# Patient Record
Sex: FEMALE | Race: WHITE
[De-identification: names, ages, dates, MRNs, and addresses within clinical notes are randomized per-mention and may not be internally consistent; named-entity substitution may affect disease eponyms.]

---

## 2022-10-07 ENCOUNTER — HOSPITAL ENCOUNTER (EMERGENCY)
Dept: HOSPITAL 47 - EC | Age: 47
Discharge: HOME | End: 2022-10-07
Payer: COMMERCIAL

## 2022-10-07 VITALS — SYSTOLIC BLOOD PRESSURE: 140 MMHG | DIASTOLIC BLOOD PRESSURE: 80 MMHG

## 2022-10-07 VITALS — HEART RATE: 68 BPM

## 2022-10-07 VITALS — TEMPERATURE: 98.4 F | RESPIRATION RATE: 16 BRPM

## 2022-10-07 DIAGNOSIS — I10: ICD-10-CM

## 2022-10-07 DIAGNOSIS — S82.832A: Primary | ICD-10-CM

## 2022-10-07 DIAGNOSIS — Z88.0: ICD-10-CM
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
## 2022-10-07 DIAGNOSIS — F17.200: ICD-10-CM

## 2022-10-07 PROCEDURE — 27752 TREATMENT OF TIBIA FRACTURE: CPT

## 2022-10-07 PROCEDURE — 96372 THER/PROPH/DIAG INJ SC/IM: CPT

## 2022-10-07 PROCEDURE — 73620 X-RAY EXAM OF FOOT: CPT

## 2022-10-07 PROCEDURE — 73600 X-RAY EXAM OF ANKLE: CPT

## 2022-10-07 PROCEDURE — 73610 X-RAY EXAM OF ANKLE: CPT

## 2022-10-07 PROCEDURE — 99283 EMERGENCY DEPT VISIT LOW MDM: CPT

## 2022-10-07 NOTE — XR
EXAMINATION TYPE: XR foot limited LT

 

DATE OF EXAM: 10/7/2022

 

COMPARISON: None

 

HISTORY: Fall, pain

 

TECHNIQUE: 2 view left foot

 

FINDINGS: The anterior dislocation of the tibia in relation to the talus is evident on the lateral pr
ojection. Posterior tibial fracture is evident. Distal fibular fracture is not well-visualized on thi
s exam. Soft tissue swelling is at the ankle. There may be a medial malleolar fracture on this image 
was not identified on ankle images.

 

Plantar calcaneal heel spur is present. The left foot otherwise appears intact and joint spaces other
wise appear normal.

 

IMPRESSION:

1.  Fracture dislocation of the distal left ankle. Please also see ankle dictation same date.

2. Plantar calcaneal heel spur.

3. Left foot appears intact as visualized. Follow-up can be performed in 7-10 days of acute trauma fo
r continued pain.

## 2022-10-07 NOTE — ED
Lower Extremity Injury HPI





- General


Chief Complaint: Extremity Injury, Lower


Stated Complaint: LT ankle injury


Time Seen by Provider: 10/07/22 06:39


Source: patient, family, RN notes reviewed


Mode of arrival: wheelchair


Limitations: no limitations





- History of Present Illness


Initial Comments: 


Patient is a 47-year-old  female presents to the emergency room after 

tripping up the stairs of her RV earlier this morning and rolling her left 

ankle. Since that time she has had significant pain and swelling in the joint. 

She did take ibuprofen approximately one hour ago without any changes in 

symptoms. Her range of motion is limited to to pain and swelling. She denies any

other extremity injury or head injury. She  reports the fall was a trip and fall

without any syncope, dizziness or altered mental status. She denies any other 

complaints or concerns including any chest pain, shortness of breath, abdominal 

pain, nausea, vomiting, fevers or chills. She denies any lower extremity 

swelling prior to the injury.  She has a past medical history significant for 

hypertension.





- Related Data


                                    Allergies











Allergy/AdvReac Type Severity Reaction Status Date / Time


 


Penicillins Allergy  Unknown Verified 10/07/22 06:31





   Childhood  














Review of Systems


ROS Statement: 


Those systems with pertinent positive or pertinent negative responses have been 

documented in the HPI.





ROS Other: All systems not noted in ROS Statement are negative.





Past Medical History


Past Medical History: Hypertension


History of Any Multi-Drug Resistant Organisms: C-DIFF


Date of last positivie culture/infection: 


Past Surgical History:  Section, Hernia Repair


Past Psychological History: Depression


Smoking Status: Current every day smoker


Past Alcohol Use History: Heavy


Past Drug Use History: None Reported





General Exam


Limitations: no limitations


General appearance: alert, in no apparent distress


Head exam: Present: atraumatic, normocephalic, normal inspection


Eye exam: Present: normal appearance, PERRL, EOMI.  Absent: scleral icterus, 

conjunctival injection, periorbital swelling


ENT exam: Present: normal exam, mucous membranes moist


Neck exam: Present: normal inspection, full ROM


Respiratory exam: Absent: respiratory distress, accessory muscle use


Rectal exam: Present: deferred


  ** Left


Ankle exam: Present: tenderness, swelling, erythema (mild).  Absent: full ROM


Foot/Toe exam: Present: tenderness, swelling, erythema (mild dorsal aspect)


Neurovascular tendon exam: Present: no vascular compromise


Gait: observed and limited by pain


Back exam: Present: normal inspection


Neurological exam: Present: alert, oriented X3, CN II-XII intact


Psychiatric exam: Present: normal affect, normal mood


Skin exam: Present: erythema (as above).  Absent: abrasion





Course


                                   Vital Signs











  10/07/22 10/07/22





  06:32 09:51


 


Temperature 98.4 F 


 


Pulse Rate 91 68


 


Respiratory 16 16





Rate  


 


Blood Pressure 124/81 120/80


 


O2 Sat by Pulse 96 99





Oximetry  














Procedures





- Orthopedic Fracture Reduction


  ** Fracture #1


Consent Obtained: verbal consent


Side: left


Fracture Reduction Location: tibia, fibula


Analgesia: other


Technique: direct manipulation (morphine, valium, tordol)


Post Reduction X-rays Demonstrate: anatomical reduction


Post-Reduction Neuro Exam: intact, no change


Post-Reduction Vascular Exam: intact, no change


Splint Applied: Yes


Patient Tolerated Procedure: well, no complications





- Orthopedic Joint Reduction


  ** Joint #1


Side: left





- Orthopedic Splinting/Casting


  ** Injury #1


Side: left


Lower Extremity Injury Location: ankle


Lower Extremity Immobilizer: posterior splint, Ace wrap


Other Orthopedic Equipment: crutches





Medical Decision Making





- Medical Decision Making


47-year-old  female presenting to the emergency room after a trip and 

fall earlier this morning with pain and swelling to her left ankle after 

bleeding to have rolled her ankle.  No other extremity injury or head trauma. 

Will check x-ray of the left ankle and left foot. Will give morphine for pain 

with recent ibuprofen administration at home. Will monitor response.





Continued pain after morphine will give Toradol along with Valium for reduction 

and splinting of fractured tibia-fibula with anterior dislocation of the tibia 

from the talus dislocation.





Tolerated reduction and splinting well. Will give Tylenol 3 starter pack to 

utilize for pain along with ibuprofen. Advised need for nonweightbearing status 

and maintaining of splint with elevation when possible. Advised need for 

orthopedic follow-up. Will discharge home.





Case discussed with Dr. Gaines.





- Radiology Data


Radiology results: report reviewed, image reviewed


 X-ray left foot shows plantar Callus spur. Left foot appears visually intact 

fracture to the left ankle distal redemonstrated.


X-ray left ankle complete impression fracture of the distal fibula and posterior

tibia dislocation from the talus from the tibia anteriorly from the talus.


Repeat x-ray of left ankle post reduction shows near anatomical alignment and 

positioning of the distal fibula and tibia fractures and reduction of posterior 

dislocation taltotibia junction





Disposition


Clinical Impression: 


 Fracture, tibia and fibula





Disposition: HOME SELF-CARE


Instructions (If sedation given, give patient instructions):  Moderate Sedation 

(ED), Ankle Fracture (ED)


Additional Instructions: 


Please continue to maintain continuous use of splint. No weightbearing with 

crutches use. May return to work if able to not bear weight and utilize crutc

hes.  Please follow-up with orthopedist.  Utilize Tylenol 3 starter pack and 

over-the-counter Motrin as needed for pain.  May apply ice every 2-3 hours for 

no longer than great 20 minutes at a time.  Elevate joint when possible.Please 

return to the Emergency Department if symptoms worsen or any other concerns.


Is patient prescribed a controlled substance at d/c from ED?: No


Referrals: 


Nonstaff,Physician [Primary Care Provider] - 1-2 days


Mike Main PAC [PHYSICIAN ASSISTANT] - 1-2 days


Time of Disposition: 10:14

## 2022-10-07 NOTE — XR
EXAMINATION TYPE: XR ankle limited LT

 

DATE OF EXAM: 10/7/2022

 

COMPARISON: 10/7/2022 earlier exam

 

HISTORY: Fracture dislocation distal left ankle

 

TECHNIQUE: AP view left ankle

 

FINDINGS: Fractures of the distal fibula is again evident. The patient's posterior tibial fracture is
 not identified. In the frontal projection there appears to be slight prominence of the medial ankle 
mortise. Also patient is along the medial aspect of the metaphyseal tibia. Donor site is not identifi
ed however. Both could be considered.

 

IMPRESSION:

1.  Reduction of prior dislocation. Some mild prominence remains at the talotibial medial ankle morti
se.

## 2022-10-07 NOTE — XR
EXAMINATION TYPE: XR ankle limited LT

 

DATE OF EXAM: 10/7/2022

 

COMPARISON: 10/7/2022 earlier exams.

 

HISTORY: Fracture dislocation left ankle

 

TECHNIQUE: Lateral left ankle

 

FINDINGS: Images obtained through a fiberglass splint. Oblique fracture of the distal fibula and post
erior tibia are again evident. There is reduction of previous tibial dislocation. Plantar calcaneal h
eel spur is present.

 

IMPRESSION:

1.  Near Anatomic alignment and positioning of the distal fibular and tibial fractures and reduction 
of prior dislocation talotibial junction.

## 2022-10-07 NOTE — XR
EXAMINATION TYPE: XR ankle complete LT

 

DATE OF EXAM: 10/7/2022

 

COMPARISON: None

 

HISTORY: Fall, pain

 

TECHNIQUE: 3V left ankle

 

FINDINGS: There is a spiral fracture of the distal metadiaphyseal fibula. There is anterior dislocati
on of the tibia from the talus There appears to be some lateral subluxation of the talus in relation 
to the tibia. Posterior tibial fracture is evident. Plantar calcaneal heel spur is noted.

 

IMPRESSION:

1.  Fractures of the distal fibula and posterior tibia.

2. Dislocation of the talus from the tibia discussed above

## 2023-02-16 ENCOUNTER — APPOINTMENT (RX ONLY)
Dept: URBAN - METROPOLITAN AREA CLINIC 259 | Facility: CLINIC | Age: 48
Setting detail: DERMATOLOGY
End: 2023-02-16

## 2023-02-16 DIAGNOSIS — D485 NEOPLASM OF UNCERTAIN BEHAVIOR OF SKIN: ICD-10-CM

## 2023-02-16 DIAGNOSIS — I78.8 OTHER DISEASES OF CAPILLARIES: ICD-10-CM | Status: STABLE

## 2023-02-16 DIAGNOSIS — D18.0 HEMANGIOMA: ICD-10-CM | Status: STABLE

## 2023-02-16 PROBLEM — D18.01 HEMANGIOMA OF SKIN AND SUBCUTANEOUS TISSUE: Status: ACTIVE | Noted: 2023-02-16

## 2023-02-16 PROBLEM — D48.5 NEOPLASM OF UNCERTAIN BEHAVIOR OF SKIN: Status: ACTIVE | Noted: 2023-02-16

## 2023-02-16 PROCEDURE — 99202 OFFICE O/P NEW SF 15 MIN: CPT | Mod: 25

## 2023-02-16 PROCEDURE — 11102 TANGNTL BX SKIN SINGLE LES: CPT

## 2023-02-16 PROCEDURE — ? BIOPSY BY SHAVE METHOD

## 2023-02-16 PROCEDURE — ? ADDITIONAL NOTES

## 2023-02-16 PROCEDURE — ? COUNSELING

## 2023-02-16 ASSESSMENT — LOCATION DETAILED DESCRIPTION DERM
LOCATION DETAILED: RIGHT MEDIAL MALAR CHEEK
LOCATION DETAILED: LEFT LATERAL ABDOMEN
LOCATION DETAILED: LEFT INFERIOR CENTRAL MALAR CHEEK

## 2023-02-16 ASSESSMENT — LOCATION ZONE DERM
LOCATION ZONE: FACE
LOCATION ZONE: TRUNK

## 2023-02-16 ASSESSMENT — LOCATION SIMPLE DESCRIPTION DERM
LOCATION SIMPLE: ABDOMEN
LOCATION SIMPLE: LEFT CHEEK
LOCATION SIMPLE: RIGHT CHEEK

## 2023-03-07 ENCOUNTER — APPOINTMENT (RX ONLY)
Dept: URBAN - METROPOLITAN AREA CLINIC 203 | Facility: CLINIC | Age: 48
Setting detail: DERMATOLOGY
End: 2023-03-07

## 2023-03-07 DIAGNOSIS — Z41.9 ENCOUNTER FOR PROCEDURE FOR PURPOSES OTHER THAN REMEDYING HEALTH STATE, UNSPECIFIED: ICD-10-CM

## 2023-03-07 PROCEDURE — ? DIOLITE 532 LASER

## 2023-03-07 ASSESSMENT — LOCATION DETAILED DESCRIPTION DERM
LOCATION DETAILED: LEFT CENTRAL MALAR CHEEK
LOCATION DETAILED: RIGHT LOWER CUTANEOUS LIP
LOCATION DETAILED: RIGHT SUPERIOR NASAL CHEEK
LOCATION DETAILED: RIGHT NASOLABIAL FOLD
LOCATION DETAILED: RIGHT INFERIOR CENTRAL MALAR CHEEK
LOCATION DETAILED: RIGHT CENTRAL MALAR CHEEK
LOCATION DETAILED: LEFT UPPER CUTANEOUS LIP
LOCATION DETAILED: RIGHT UPPER CUTANEOUS LIP

## 2023-03-07 ASSESSMENT — LOCATION SIMPLE DESCRIPTION DERM
LOCATION SIMPLE: RIGHT CHEEK
LOCATION SIMPLE: LEFT LIP
LOCATION SIMPLE: LEFT CHEEK
LOCATION SIMPLE: RIGHT LIP

## 2023-03-07 ASSESSMENT — LOCATION ZONE DERM
LOCATION ZONE: FACE
LOCATION ZONE: LIP

## 2023-03-27 ENCOUNTER — APPOINTMENT (RX ONLY)
Dept: URBAN - METROPOLITAN AREA CLINIC 259 | Facility: CLINIC | Age: 48
Setting detail: DERMATOLOGY
End: 2023-03-27

## 2023-03-27 DIAGNOSIS — D22 MELANOCYTIC NEVI: ICD-10-CM

## 2023-03-27 PROBLEM — D48.5 NEOPLASM OF UNCERTAIN BEHAVIOR OF SKIN: Status: ACTIVE | Noted: 2023-03-27

## 2023-03-27 PROCEDURE — ? SHAVE REMOVAL

## 2023-03-27 PROCEDURE — 11301 SHAVE SKIN LESION 0.6-1.0 CM: CPT

## 2023-03-27 ASSESSMENT — LOCATION DETAILED DESCRIPTION DERM: LOCATION DETAILED: LEFT LATERAL ABDOMEN

## 2023-03-27 ASSESSMENT — LOCATION ZONE DERM: LOCATION ZONE: TRUNK

## 2023-03-27 ASSESSMENT — LOCATION SIMPLE DESCRIPTION DERM: LOCATION SIMPLE: ABDOMEN

## 2023-03-27 NOTE — PROCEDURE: SHAVE REMOVAL
Path Notes Override (Will Replace All Of The Above Text): [place] #[id number] Path Notes Override (Will Replace All Of The Above Text): [place] #[id number]

## 2024-02-08 ENCOUNTER — APPOINTMENT (RX ONLY)
Dept: URBAN - METROPOLITAN AREA CLINIC 259 | Facility: CLINIC | Age: 49
Setting detail: DERMATOLOGY
End: 2024-02-08

## 2024-02-08 DIAGNOSIS — L82.1 OTHER SEBORRHEIC KERATOSIS: ICD-10-CM | Status: STABLE

## 2024-02-08 DIAGNOSIS — L65.9 NONSCARRING HAIR LOSS, UNSPECIFIED: ICD-10-CM

## 2024-02-08 DIAGNOSIS — L259 CONTACT DERMATITIS AND OTHER ECZEMA, UNSPECIFIED CAUSE: ICD-10-CM | Status: WORSENING

## 2024-02-08 PROBLEM — L30.8 OTHER SPECIFIED DERMATITIS: Status: ACTIVE | Noted: 2024-02-08

## 2024-02-08 PROCEDURE — ? PRESCRIPTION MEDICATION MANAGEMENT

## 2024-02-08 PROCEDURE — ? ADDITIONAL NOTES

## 2024-02-08 PROCEDURE — ? PRESCRIPTION

## 2024-02-08 PROCEDURE — 99213 OFFICE O/P EST LOW 20 MIN: CPT

## 2024-02-08 PROCEDURE — ? COUNSELING

## 2024-02-08 RX ORDER — BIMATOPROST 0.3 MG/ML
SOLUTION/ DROPS OPHTHALMIC
Qty: 5 | Refills: 2 | Status: ERX | COMMUNITY
Start: 2024-02-08

## 2024-02-08 RX ORDER — MOMETASONE FUROATE 1 MG/G
CREAM TOPICAL
Qty: 45 | Refills: 1 | Status: ERX | COMMUNITY
Start: 2024-02-08

## 2024-02-08 RX ADMIN — BIMATOPROST: 0.3 SOLUTION/ DROPS OPHTHALMIC at 00:00

## 2024-02-08 RX ADMIN — MOMETASONE FUROATE: 1 CREAM TOPICAL at 00:00

## 2024-02-08 ASSESSMENT — LOCATION DETAILED DESCRIPTION DERM
LOCATION DETAILED: RIGHT TARSAL MARGIN OF THE INFERIOR EYELID
LOCATION DETAILED: RIGHT SUPERIOR LATERAL MIDBACK
LOCATION DETAILED: RIGHT CENTRAL PARIETAL SCALP
LOCATION DETAILED: RIGHT LATERAL DORSAL FOOT
LOCATION DETAILED: LEFT TARSAL MARGIN OF THE INFERIOR EYELID

## 2024-02-08 ASSESSMENT — LOCATION ZONE DERM
LOCATION ZONE: FEET
LOCATION ZONE: SCALP
LOCATION ZONE: EYELID
LOCATION ZONE: TRUNK

## 2024-02-08 ASSESSMENT — LOCATION SIMPLE DESCRIPTION DERM
LOCATION SIMPLE: RIGHT LOWER LID TARSAL MARGIN
LOCATION SIMPLE: RIGHT LOWER BACK
LOCATION SIMPLE: LEFT LOWER LID TARSAL MARGIN
LOCATION SIMPLE: RIGHT FOOT
LOCATION SIMPLE: SCALP

## 2024-02-08 NOTE — HPI: EVALUATION OF SKIN LESION(S)
Hpi Title: Evaluation of Skin Lesions
How Severe Are Your Spot(S)?: mild
Have Your Spot(S) Been Treated In The Past?: has not been treated
Additional History: Spot on right foot right side of the head right side of the back. Eyelashes following out

## 2024-02-08 NOTE — PROCEDURE: PRESCRIPTION MEDICATION MANAGEMENT
Render In Strict Bullet Format?: No
Initiate Treatment: mometasone 0.1 % topical cream \\nQuantity: 45.0 g  Days Supply: 14\\nSig: Apply to affected area on the foot and back as needed for flares BID x1-2 weeks
Detail Level: Zone
Initiate Treatment: Latisse 0.03 % eyelash drops \\nQuantity: 5.0 ml\\nSig: Apply one drop to each eyelid at lash line qhs

## 2024-02-08 NOTE — PROCEDURE: ADDITIONAL NOTES
Detail Level: Simple
Render Risk Assessment In Note?: no
Additional Notes: Bx if persists in one month

## 2024-03-19 ENCOUNTER — APPOINTMENT (RX ONLY)
Dept: URBAN - METROPOLITAN AREA CLINIC 259 | Facility: CLINIC | Age: 49
Setting detail: DERMATOLOGY
End: 2024-03-19

## 2024-03-19 DIAGNOSIS — L259 CONTACT DERMATITIS AND OTHER ECZEMA, UNSPECIFIED CAUSE: ICD-10-CM

## 2024-03-19 DIAGNOSIS — R21 RASH AND OTHER NONSPECIFIC SKIN ERUPTION: ICD-10-CM | Status: WORSENING

## 2024-03-19 DIAGNOSIS — L21.8 OTHER SEBORRHEIC DERMATITIS: ICD-10-CM | Status: WORSENING

## 2024-03-19 PROBLEM — L30.8 OTHER SPECIFIED DERMATITIS: Status: ACTIVE | Noted: 2024-03-19

## 2024-03-19 PROCEDURE — 11102 TANGNTL BX SKIN SINGLE LES: CPT

## 2024-03-19 PROCEDURE — 99214 OFFICE O/P EST MOD 30 MIN: CPT | Mod: 25

## 2024-03-19 PROCEDURE — ? PRESCRIPTION

## 2024-03-19 PROCEDURE — ? COUNSELING

## 2024-03-19 PROCEDURE — ? BIOPSY BY SHAVE METHOD

## 2024-03-19 PROCEDURE — ? INTRALESIONAL KENALOG

## 2024-03-19 PROCEDURE — 11900 INJECT SKIN LESIONS </W 7: CPT

## 2024-03-19 PROCEDURE — ? PRESCRIPTION MEDICATION MANAGEMENT

## 2024-03-19 RX ORDER — KETOCONAZOLE 20 MG/ML
SHAMPOO, SUSPENSION TOPICAL
Qty: 120 | Refills: 11 | Status: ERX | COMMUNITY
Start: 2024-03-19

## 2024-03-19 RX ADMIN — KETOCONAZOLE: 20 SHAMPOO, SUSPENSION TOPICAL at 00:00

## 2024-03-19 ASSESSMENT — LOCATION SIMPLE DESCRIPTION DERM
LOCATION SIMPLE: RIGHT FOOT
LOCATION SIMPLE: RIGHT LOWER BACK
LOCATION SIMPLE: RIGHT UPPER BACK
LOCATION SIMPLE: SCALP
LOCATION SIMPLE: RIGHT CALF

## 2024-03-19 ASSESSMENT — LOCATION DETAILED DESCRIPTION DERM
LOCATION DETAILED: RIGHT DISTAL CALF
LOCATION DETAILED: RIGHT INFERIOR UPPER BACK
LOCATION DETAILED: RIGHT LATERAL DORSAL FOOT
LOCATION DETAILED: RIGHT PROXIMAL CALF
LOCATION DETAILED: RIGHT SUPERIOR LATERAL MIDBACK
LOCATION DETAILED: LEFT SUPERIOR PARIETAL SCALP

## 2024-03-19 ASSESSMENT — LOCATION ZONE DERM
LOCATION ZONE: FEET
LOCATION ZONE: TRUNK
LOCATION ZONE: SCALP
LOCATION ZONE: LEG

## 2024-03-19 NOTE — PROCEDURE: INTRALESIONAL KENALOG
Consent: The risks of atrophy were reviewed with the patient.
How Many Mls Were Removed From The 40 Mg/Ml (5ml) Vial When Preparing The Injectable Solution?: 0
Kenalog Type Of Vial: Multiple Dose
Kenalog Preparation: Kenalog
Detail Level: Simple
Total Volume (Ccs): 0.2
Include Z78.9 (Other Specified Conditions Influencing Health Status) As An Associated Diagnosis?: No
Validate Note Data When Using Inventory: Yes
Concentration Of Kenalog Solution Injected (Mg/Ml): 4.0
Ndc# For Kenalog Only: 37484-145-16
Medical Necessity Clause: This procedure was medically necessary because the lesions that were treated were:

## 2024-03-19 NOTE — PROCEDURE: PRESCRIPTION MEDICATION MANAGEMENT
Render In Strict Bullet Format?: No
Discontinue Regimen: mometasone 0.1 % topical cream \\nQuantity: 45.0 g  Days Supply: 14\\nSig: Apply to affected area on the foot and back as needed for flares BID x1-2 weeks
Detail Level: Zone
Initiate Treatment: ketoconazole 2 % shampoo Q weeks\\nQuantity: 120.0 ml  Days Supply: 30\\nSig: Apply to scalp daily . Lather to scalp and let sit for 5 minutes before rinsing